# Patient Record
Sex: FEMALE | Race: WHITE | Employment: FULL TIME | URBAN - METROPOLITAN AREA
[De-identification: names, ages, dates, MRNs, and addresses within clinical notes are randomized per-mention and may not be internally consistent; named-entity substitution may affect disease eponyms.]

---

## 2020-02-24 ENCOUNTER — HOSPITAL ENCOUNTER (EMERGENCY)
Age: 45
Discharge: HOME | End: 2020-02-24
Attending: EMERGENCY MEDICINE
Payer: COMMERCIAL

## 2020-02-24 ENCOUNTER — APPOINTMENT (OUTPATIENT)
Dept: CT IMAGING | Age: 45
End: 2020-02-24
Payer: COMMERCIAL

## 2020-02-24 VITALS
BODY MASS INDEX: 24.25 KG/M2 | SYSTOLIC BLOOD PRESSURE: 168 MMHG | TEMPERATURE: 98.1 F | HEART RATE: 81 BPM | WEIGHT: 160 LBS | OXYGEN SATURATION: 98 % | RESPIRATION RATE: 16 BRPM | DIASTOLIC BLOOD PRESSURE: 108 MMHG | HEIGHT: 68 IN

## 2020-02-24 LAB
ALBUMIN SERPL-MCNC: 4.6 G/DL (ref 3.5–5.2)
ALP BLD-CCNC: 56 U/L (ref 35–104)
ALT SERPL-CCNC: 15 U/L (ref 0–32)
ANION GAP SERPL CALCULATED.3IONS-SCNC: 10 MMOL/L (ref 7–16)
AST SERPL-CCNC: 16 U/L (ref 0–31)
BASOPHILS ABSOLUTE: 0.04 E9/L (ref 0–0.2)
BASOPHILS RELATIVE PERCENT: 0.6 % (ref 0–2)
BILIRUB SERPL-MCNC: 0.5 MG/DL (ref 0–1.2)
BILIRUBIN URINE: NEGATIVE
BLOOD, URINE: NEGATIVE
BUN BLDV-MCNC: 8 MG/DL (ref 6–20)
CALCIUM SERPL-MCNC: 10 MG/DL (ref 8.6–10.2)
CHLORIDE BLD-SCNC: 105 MMOL/L (ref 98–107)
CLARITY: CLEAR
CO2: 27 MMOL/L (ref 22–29)
COLOR: YELLOW
CREAT SERPL-MCNC: 0.9 MG/DL (ref 0.5–1)
EOSINOPHILS ABSOLUTE: 0.11 E9/L (ref 0.05–0.5)
EOSINOPHILS RELATIVE PERCENT: 1.7 % (ref 0–6)
GFR AFRICAN AMERICAN: >60
GFR NON-AFRICAN AMERICAN: >60 ML/MIN/1.73
GLUCOSE BLD-MCNC: 111 MG/DL (ref 74–99)
GLUCOSE URINE: NEGATIVE MG/DL
HCG, URINE, POC: NEGATIVE
HCT VFR BLD CALC: 44 % (ref 34–48)
HEMOGLOBIN: 14.2 G/DL (ref 11.5–15.5)
IMMATURE GRANULOCYTES #: 0.02 E9/L
IMMATURE GRANULOCYTES %: 0.3 % (ref 0–5)
KETONES, URINE: NEGATIVE MG/DL
LEUKOCYTE ESTERASE, URINE: NEGATIVE
LYMPHOCYTES ABSOLUTE: 1.58 E9/L (ref 1.5–4)
LYMPHOCYTES RELATIVE PERCENT: 23.9 % (ref 20–42)
Lab: NORMAL
MCH RBC QN AUTO: 30 PG (ref 26–35)
MCHC RBC AUTO-ENTMCNC: 32.3 % (ref 32–34.5)
MCV RBC AUTO: 93 FL (ref 80–99.9)
MONOCYTES ABSOLUTE: 0.44 E9/L (ref 0.1–0.95)
MONOCYTES RELATIVE PERCENT: 6.7 % (ref 2–12)
NEGATIVE QC PASS/FAIL: NORMAL
NEUTROPHILS ABSOLUTE: 4.42 E9/L (ref 1.8–7.3)
NEUTROPHILS RELATIVE PERCENT: 66.8 % (ref 43–80)
NITRITE, URINE: NEGATIVE
PDW BLD-RTO: 13.1 FL (ref 11.5–15)
PH UA: 5.5 (ref 5–9)
PLATELET # BLD: 278 E9/L (ref 130–450)
PMV BLD AUTO: 9.6 FL (ref 7–12)
POSITIVE QC PASS/FAIL: NORMAL
POTASSIUM REFLEX MAGNESIUM: 4.6 MMOL/L (ref 3.5–5)
PROTEIN UA: NEGATIVE MG/DL
RBC # BLD: 4.73 E12/L (ref 3.5–5.5)
SODIUM BLD-SCNC: 142 MMOL/L (ref 132–146)
SPECIFIC GRAVITY UA: 1.01 (ref 1–1.03)
TOTAL PROTEIN: 7.2 G/DL (ref 6.4–8.3)
UROBILINOGEN, URINE: 0.2 E.U./DL
WBC # BLD: 6.6 E9/L (ref 4.5–11.5)

## 2020-02-24 PROCEDURE — 74176 CT ABD & PELVIS W/O CONTRAST: CPT

## 2020-02-24 PROCEDURE — 80053 COMPREHEN METABOLIC PANEL: CPT

## 2020-02-24 PROCEDURE — 2580000003 HC RX 258: Performed by: NURSE PRACTITIONER

## 2020-02-24 PROCEDURE — 36415 COLL VENOUS BLD VENIPUNCTURE: CPT

## 2020-02-24 PROCEDURE — 81003 URINALYSIS AUTO W/O SCOPE: CPT

## 2020-02-24 PROCEDURE — 99284 EMERGENCY DEPT VISIT MOD MDM: CPT

## 2020-02-24 PROCEDURE — 87088 URINE BACTERIA CULTURE: CPT

## 2020-02-24 PROCEDURE — 85025 COMPLETE CBC W/AUTO DIFF WBC: CPT

## 2020-02-24 RX ORDER — 0.9 % SODIUM CHLORIDE 0.9 %
1000 INTRAVENOUS SOLUTION INTRAVENOUS ONCE
Status: COMPLETED | OUTPATIENT
Start: 2020-02-24 | End: 2020-02-24

## 2020-02-24 RX ORDER — METHYLPREDNISOLONE 4 MG/1
TABLET ORAL
Qty: 1 KIT | Refills: 0 | Status: SHIPPED | OUTPATIENT
Start: 2020-02-24 | End: 2020-03-01

## 2020-02-24 RX ORDER — SODIUM CHLORIDE 0.9 % (FLUSH) 0.9 %
10 SYRINGE (ML) INJECTION ONCE
Status: DISCONTINUED | OUTPATIENT
Start: 2020-02-24 | End: 2020-02-24 | Stop reason: HOSPADM

## 2020-02-24 RX ADMIN — SODIUM CHLORIDE 1000 ML: 9 INJECTION, SOLUTION INTRAVENOUS at 08:39

## 2020-02-24 ASSESSMENT — PAIN DESCRIPTION - DESCRIPTORS: DESCRIPTORS: ACHING

## 2020-02-24 ASSESSMENT — PAIN DESCRIPTION - LOCATION: LOCATION: BACK

## 2020-02-24 ASSESSMENT — PAIN DESCRIPTION - PAIN TYPE: TYPE: ACUTE PAIN

## 2020-02-24 ASSESSMENT — PAIN SCALES - GENERAL: PAINLEVEL_OUTOF10: 8

## 2020-02-24 NOTE — ED PROVIDER NOTES
[facility]    Department of Emergency Medicine   ED  Provider Note  Admit Date/RoomTime: 2/24/2020  8:02 AM  ED Room: [address] [place] [address]  MRN: [id number]  Chief Complaint       Flank Pain (since friday after passing kideny stone)    History of Present Illness   Source of history provided by:  patient. History/Exam Limitations: none. Chanel Vidales is a 38 y.o. old female who has a past medical history of:   Past Medical History:   Diagnosis Date    Kidney stone     Pancreatitis, acute     presents to the emergency department by private vehicle, for complaints of gradual onset sharp pain in the left flank and in the right flank with radiation to the left and right back which began 4 day(s) prior to arrival.   There has been similar episodes in the past .  Since onset the symptoms have been stable. The pain is associated with nothing pertinent. The pain is aggravated by movement and relieved by nothing. There has been NO abdominal pain, chills, cloudy urine, constipation, diarrhea, dysuria or vomiting. Patient states she thinks she passed a kidney stone on Friday but her pain started increasing yesterday again. She does have a history of kidney stones and denies any nausea, vomiting or diarrhea. GYN History: regular periods. STD History: no history of PID, STD's. Patient's last menstrual period was 08/08/2013. Current Pregnancy: No.     Birth Control: None. Gravid Status: [id number]    N/A  ROS   Pertinent positives and negatives are stated within HPI, all other systems reviewed and are negative. Past Surgical History:   Procedure Laterality Date    HYSTERECTOMY  11/12/13    NECK SURGERY     Social History:  reports that she has never smoked. She has never used smokeless tobacco. She reports that she does not drink alcohol or use drugs. Family History: family history is not on file.    Allergies: Demerol hcl [meperidine] and Phenergan [promethazine hcl]    Physical Exam           ED Triage Vitals   BP Comprehensive Metabolic Panel w/ Reflex to MG   Result Value Ref Range    Sodium 142 132 - 146 mmol/L    Potassium reflex Magnesium 4.6 3.5 - 5.0 mmol/L    Chloride 105 98 - 107 mmol/L    CO2 27 22 - 29 mmol/L    Anion Gap 10 7 - 16 mmol/L    Glucose 111 (H) 74 - 99 mg/dL    BUN 8 6 - 20 mg/dL    CREATININE 0.9 0.5 - 1.0 mg/dL    GFR Non-African American >60 >=60 mL/min/1.73    GFR African American >60     Calcium 10.0 8.6 - 10.2 mg/dL    Total Protein 7.2 6.4 - 8.3 g/dL    Alb 4.6 3.5 - 5.2 g/dL    Total Bilirubin 0.5 0.0 - 1.2 mg/dL    Alkaline Phosphatase 56 35 - 104 U/L    ALT 15 0 - 32 U/L    AST 16 0 - 31 U/L   CBC Auto Differential   Result Value Ref Range    WBC 6.6 4.5 - 11.5 E9/L    RBC 4.73 3.50 - 5.50 E12/L    Hemoglobin 14.2 11.5 - 15.5 g/dL    Hematocrit 44.0 34.0 - 48.0 %    MCV 93.0 80.0 - 99.9 fL    MCH 30.0 26.0 - 35.0 pg    MCHC 32.3 32.0 - 34.5 %    RDW 13.1 11.5 - 15.0 fL    Platelets 700 368 - 684 E9/L    MPV 9.6 7.0 - 12.0 fL    Neutrophils % 66.8 43.0 - 80.0 %    Immature Granulocytes % 0.3 0.0 - 5.0 %    Lymphocytes % 23.9 20.0 - 42.0 %    Monocytes % 6.7 2.0 - 12.0 %    Eosinophils % 1.7 0.0 - 6.0 %    Basophils % 0.6 0.0 - 2.0 %    Neutrophils Absolute 4.42 1.80 - 7.30 E9/L    Immature Granulocytes # 0.02 E9/L    Lymphocytes Absolute 1.58 1.50 - 4.00 E9/L    Monocytes Absolute 0.44 0.10 - 0.95 E9/L    Eosinophils Absolute 0.11 0.05 - 0.50 E9/L    Basophils Absolute 0.04 0.00 - 0.20 E9/L   POC Pregnancy Urine   Result Value Ref Range    HCG, Urine, POC Negative Negative    Lot Number [id number]     Positive QC Pass/Fail Pass     Negative QC Pass/Fail Pass      Imaging: All Radiology results interpreted by Radiologist unless otherwise noted. CT ABDOMEN PELVIS WO CONTRAST Additional Contrast? None   Final Result   No acute inflammation, bowel obstruction or obstructing uropathy.               ED Course / Medical Decision Making     Medications   0.9 % sodium chloride bolus (0 mLs Intravenous Stopped 2/24/20 1025)        Re-examination:  2/24/20       Time:     Patients symptoms are improving. Consults:   None    Procedures:   none    MDM:   Patient is a 48-year-old female who came to the emergency department with complaints of bilateral flank pain. Patient does have a history of kidney stones. Blood work was obtained as well as urine both which are unremarkable at this time. A CAT scan of her abdomen and pelvis showed no acute inflammation or bowel obstruction with no obstructing uropathy. Patient was given IV fluids while in the emergency department. She did refuse any type of pain medications. She will be discharged at  this time and was given a prescription for steroids to home on due to a possible lumbar strain. Counseling: The emergency provider has spoken with the patient and discussed todays results, in addition to providing specific details for the plan of care and counseling regarding the diagnosis and prognosis. Questions are answered at this time and they are agreeable with the plan to be discharged. Assessment      1. Flank pain      Plan   Discharge to home  Patient condition is good    New Medications     Discharge Medication List as of 2/24/2020  9:34 AM      START taking these medications    Details   methylPREDNISolone (MEDROL, STORMY,) 4 MG tablet Take as prescribed, Disp-1 kit, R-0Print           Electronically signed by RALEIGH Weiner NP   DD: 2/24/20  **This report was transcribed using voice recognition software. Every effort was made to ensure accuracy; however, inadvertent computerized transcription errors may be present.   END OF ED PROVIDER NOTE      RALEIGH Cartagena NP  02/26/20 [id number]

## 2020-02-26 LAB — URINE CULTURE, ROUTINE: NORMAL

## 2020-07-25 ENCOUNTER — APPOINTMENT (OUTPATIENT)
Dept: GENERAL RADIOLOGY | Age: 45
End: 2020-07-25
Payer: COMMERCIAL

## 2020-07-25 ENCOUNTER — HOSPITAL ENCOUNTER (EMERGENCY)
Age: 45
Discharge: HOME | End: 2020-07-25
Attending: EMERGENCY MEDICINE
Payer: COMMERCIAL

## 2020-07-25 VITALS
HEART RATE: 76 BPM | TEMPERATURE: 96.8 F | DIASTOLIC BLOOD PRESSURE: 88 MMHG | SYSTOLIC BLOOD PRESSURE: 150 MMHG | RESPIRATION RATE: 16 BRPM | OXYGEN SATURATION: 100 %

## 2020-07-25 PROCEDURE — 72100 X-RAY EXAM L-S SPINE 2/3 VWS: CPT

## 2020-07-25 PROCEDURE — 73630 X-RAY EXAM OF FOOT: CPT

## 2020-07-25 PROCEDURE — 99283 EMERGENCY DEPT VISIT LOW MDM: CPT

## 2020-07-25 ASSESSMENT — PAIN DESCRIPTION - FREQUENCY: FREQUENCY: INTERMITTENT

## 2020-07-25 ASSESSMENT — PAIN DESCRIPTION - ORIENTATION: ORIENTATION: POSTERIOR;LOWER

## 2020-07-25 ASSESSMENT — PAIN SCALES - GENERAL: PAINLEVEL_OUTOF10: 6

## 2020-07-25 ASSESSMENT — PAIN DESCRIPTION - LOCATION: LOCATION: ANKLE;FOOT

## 2020-07-25 NOTE — LETTER
[address] [facility] Emergency Department  [address]  [facility] [address]  Phone: [phone]               July 25, 2020    Patient: Maci Bello   YOB: 1975   Date of Visit: 7/25/2020       To Whom It May Concern:    Maci Bello was seen and treated in our emergency department on 7/25/2020. She may return to work on 7/26/2020.       Sincerely,       Lindsay Rodriguez RN         Signature:__________________________________

## 2020-07-25 NOTE — ED PROVIDER NOTES
25-year-old female presenting with bilateral heel pain. She says she was walking down the stairs last night, fell off of a couple of steps onto her heels. She has had the discomfort in both sides worse than the left. No back pain or loss of consciousness or falling otherwise. Is awake, alert, oriented x4. Normally she runs a lot and has pain underneath her feet. No prior pain until the fall occurred. Did not go running on it but is able to ambulate on her feet a little bit. No knee pain or hip pain. This was sudden, ongoing for about a day or so, mild in severity, achy discomfort, associated with no knee, back, or hip pain. No family history on file. Past Surgical History:   Procedure Laterality Date    HYSTERECTOMY  11/12/13    NECK SURGERY         Review of Systems   Musculoskeletal:        Bilateral heel pain   All other systems reviewed and are negative. Physical Exam  Constitutional:       General: She is not in acute distress. Appearance: She is well-developed. HENT:      Head: Normocephalic and atraumatic. Eyes:      Conjunctiva/sclera: Conjunctivae normal.      Pupils: Pupils are equal, round, and reactive to light. Neck:      Musculoskeletal: Normal range of motion. Thyroid: No thyromegaly. Cardiovascular:      Rate and Rhythm: Normal rate and regular rhythm. Pulmonary:      Effort: Pulmonary effort is normal. No respiratory distress. Breath sounds: Normal breath sounds. Abdominal:      General: There is no distension. Palpations: Abdomen is soft. Tenderness: There is no abdominal tenderness. There is no guarding or rebound. Musculoskeletal: Normal range of motion. General: Tenderness present. Feet:       Comments: Tenderness of bilateral heels, worse on the left   Skin:     General: Skin is warm and dry. Findings: No erythema. Neurological:      Mental Status: She is alert and oriented to person, place, and time. Cranial Nerves: No cranial nerve deficit. Coordination: Coordination normal.          Procedures     MDM              --------------------------------------------- PAST HISTORY ---------------------------------------------  Past Medical History:  has a past medical history of Kidney stone and Pancreatitis, acute. Past Surgical History:  has a past surgical history that includes Neck surgery and Hysterectomy (11/12/13). Social History:  reports that she has never smoked. She has never used smokeless tobacco. She reports that she does not drink alcohol or use drugs. Family History: family history is not on file. The patients home medications have been reviewed. Allergies: Demerol hcl [meperidine] and Phenergan [promethazine hcl]    -------------------------------------------------- RESULTS -------------------------------------------------  Labs:  No results found for this visit on 07/25/20. Radiology:  XR FOOT LEFT (MIN 3 VIEWS)   Final Result      NO ACUTE FRACTURE OR DISLOCATION LEFT FOOT      Prominent dorsal and ventral calcaneal spurs. This is amenable for   fluoroscopy guided steroid injection for pain management. .      XR FOOT RIGHT (MIN 3 VIEWS)   Final Result      NO ACUTE FRACTURE OR DISLOCATION RIGHT FOOT      Prominent dorsal and ventral calcaneal spurs. .         XR LUMBAR SPINE (2-3 VIEWS)   Final Result      NO ACUTE FRACTURE OR MALALIGNMENT      Marginal spurs suggesting of osteoarthritic changes. Joby Kaur ------------------------- NURSING NOTES AND VITALS REVIEWED ---------------------------  Date / Time Roomed:  7/25/2020  8:39 AM  ED Bed Assignment:  51/ST-6    The nursing notes within the ED encounter and vital signs as below have been reviewed.    BP (!) 150/88   Pulse 76   Temp 96.8 °F (36 °C)   Resp 16   LMP 09/27/2013   SpO2 100%   Oxygen Saturation Interpretation: Normal      ------------------------------------------ PROGRESS NOTES ------------------------------------------  I have spoken with the patient and discussed todays results, in addition to providing specific details for the plan of care and counseling regarding the diagnosis and prognosis. Their questions are answered at this time and they are agreeable with the plan. I discussed at length with them reasons for immediate return here for re evaluation. They will followup with primary care by calling their office tomorrow. --------------------------------- ADDITIONAL PROVIDER NOTES ---------------------------------  At this time the patient is without objective evidence of an acute process requiring hospitalization or inpatient management. They have remained hemodynamically stable throughout their entire ED visit and are stable for discharge with outpatient follow-up. The plan has been discussed in detail and they are aware of the specific conditions for emergent return, as well as the importance of follow-up. New Prescriptions    No medications on file       Diagnosis:  1. Heel spur, left    2. Heel spur, right        Disposition:  Patient's disposition: Discharge to home  Patient's condition is stable.            Ibeth Smith DO  07/25/20 [id number]

## 2025-05-14 ENCOUNTER — TELEPHONE (OUTPATIENT)
Dept: ORTHOPEDIC SURGERY | Age: 50
End: 2025-05-14

## 2025-05-29 ENCOUNTER — OFFICE VISIT (OUTPATIENT)
Dept: ORTHOPEDIC SURGERY | Age: 50
End: 2025-05-29
Payer: COMMERCIAL

## 2025-05-29 VITALS
TEMPERATURE: 98.2 F | RESPIRATION RATE: 16 BRPM | HEART RATE: 85 BPM | DIASTOLIC BLOOD PRESSURE: 100 MMHG | SYSTOLIC BLOOD PRESSURE: 139 MMHG | OXYGEN SATURATION: 99 %

## 2025-05-29 DIAGNOSIS — M25.561 RIGHT KNEE PAIN, UNSPECIFIED CHRONICITY: Primary | ICD-10-CM

## 2025-05-29 DIAGNOSIS — M17.11 PRIMARY OSTEOARTHRITIS OF RIGHT KNEE: ICD-10-CM

## 2025-05-29 PROCEDURE — 99203 OFFICE O/P NEW LOW 30 MIN: CPT | Performed by: STUDENT IN AN ORGANIZED HEALTH CARE EDUCATION/TRAINING PROGRAM

## 2025-05-29 RX ORDER — LEVOTHYROXINE SODIUM 25 UG/1
25 TABLET ORAL DAILY
COMMUNITY

## 2025-05-29 RX ORDER — ERGOCALCIFEROL 1.25 MG/1
50000 CAPSULE, LIQUID FILLED ORAL WEEKLY
COMMUNITY
Start: 2025-05-19

## 2025-05-29 NOTE — PROGRESS NOTES
Left knee Steroid Injection    The left knee identified as the injection site. The risk and benefits of a cortisone injection were explained and the patient consented to the injection. Under sterile conditions,the left  knee was injected with a mixture of 80  mg of Kenalog and 3 mL of 0.25% Marcaine without complication. A sterile bandage was applied.New Patient     Referring Provider:   No referring provider defined for this encounter.    CHIEF COMPLAINT:   Chief Complaint   Patient presents with    Follow-up     Rt knee pain        HPI:    History of Present Illness  The patient presents for evaluation of right knee pain.    She has been experiencing persistent discomfort in her right knee since the end of 12/2024. The pain is a daily occurrence, particularly noticeable during ambulation, ascending or descending stairs, and transitioning from a seated to standing position. She describes a sensation of instability in the knee, as if it might hyperextend, which has resulted in several falls. Despite these symptoms, she reports that the pain is not severe. She has a history of cheerleading and gymnastics, which may have contributed to her current condition. She has been utilizing a knee brace for support and has found relief from swelling with prednisone and topical Voltaren, applied 4 to 5 times daily. Previous radiographic examinations were conducted by Dr. Woodard, who also administered an injection into the knee. However, this intervention only provided temporary relief for a duration of 3 days.    SOCIAL HISTORY  Exercise: Running       PAST MEDICAL HISTORY  Past Medical History:   Diagnosis Date    Kidney stone     Pancreatitis, acute        PAST SURGICAL HISTORY  Past Surgical History:   Procedure Laterality Date    HYSTERECTOMY (CERVIX STATUS UNKNOWN)  11/12/13    NECK SURGERY           FAMILY HISTORY   History reviewed. No pertinent family history.    SOCIAL HISTORY  Social History     Socioeconomic History